# Patient Record
Sex: MALE | Employment: FULL TIME | URBAN - METROPOLITAN AREA
[De-identification: names, ages, dates, MRNs, and addresses within clinical notes are randomized per-mention and may not be internally consistent; named-entity substitution may affect disease eponyms.]

---

## 2020-08-07 ENCOUNTER — THERAPY VISIT (OUTPATIENT)
Dept: PHYSICAL THERAPY | Facility: CLINIC | Age: 45
End: 2020-08-07
Payer: COMMERCIAL

## 2020-08-07 DIAGNOSIS — S46.911A STRAIN OF RIGHT SHOULDER: ICD-10-CM

## 2020-08-07 PROCEDURE — 97110 THERAPEUTIC EXERCISES: CPT | Mod: GP | Performed by: PHYSICAL THERAPIST

## 2020-08-07 PROCEDURE — 97161 PT EVAL LOW COMPLEX 20 MIN: CPT | Mod: GP | Performed by: PHYSICAL THERAPIST

## 2020-08-07 NOTE — PROGRESS NOTES
[facility] Initial Evaluation  Subjective:  The history is provided by the patient. No  was used.   Therapist Generated HPI Evaluation  Problem details: Pt reports first injuring his R shoulder when performing a press 5-6 years ago. More recently mid July 2020 noticed pain after pulling on cord for  (had noticed some pain already coming on from building shed).  MD referred to PT.  Notes history of neck injury from age 21 with resultant R upper extremity gets tired more easily. .         Type of problem:  Right shoulder.    This is a recurrent condition.  Condition occurred with:  Other.    Patient reports pain:  Anterior.    Pain radiates to:  Elbow, hand and upper arm.     Associated symptoms:  Loss of motion/stiffness and loss of strength. Symptoms are exacerbated by using arm overhead, lifting and lying on extremity (pushing)  and relieved by other (prednisone).                              Objective:  System                   Shoulder Evaluation:  ROM:  AROM:  normal (pain during movement on R abduction)                                  Strength:    Flexion: Left:5/5   Pain:    Right: 4/5      Pain:  ++    Abduction:  Left: 5/5  Pain:    Right: 5-/5     Pain:    Internal Rotation:  Left:5/5     Pain:    Right: 5/5     Pain:  External Rotation:   Left:5/5     Pain:   Right:5/5     Pain:        Elbow Flexion:  Left:5/5     Pain:    Right:5-/5      Pain:+  Elbow Extension:  Left:5/5     Pain:    Right:5-/5     Pain:                                           [facility] Cervical Evaluation    Posture:  Sitting: fair            Movement Loss:    Flexion (Flex): nil  Retraction (RET): nil  Extension (EXT): min  Lateral Flexion Right (LF R): nil  Lateral Flexion Left (LF L): nil  Rotation Right (ROT R): nil  Rotation Left (ROT L): nil                                                 ROS    Assessment/Plan:    Patient is a 45 year old male with right side shoulder  complaints.    Patient has the following significant findings with corresponding treatment plan.                Diagnosis 1:  R shoulder strain  Pain -  manual therapy, self management, education, directional preference exercise and home program  Decreased ROM/flexibility - manual therapy, therapeutic exercise, therapeutic activity and home program  Decreased strength - therapeutic exercise, therapeutic activities and home program  Inflammation - self management/home program  Decreased function - therapeutic activities and home program  Impaired posture - neuro re-education and home program      Low complexity using standardized patient assessment instrument and/or measureable assessment of functional outcome.  Cumulative Therapy Evaluation is: Low complexity.    Previous and current functional limitations:  (See Goal Flow Sheet for this information)    Short term and Long term goals: (See Goal Flow Sheet for this information)     Communication ability:  Patient appears to be able to clearly communicate and understand verbal and written communication and follow directions correctly.  Treatment Explanation - The following has been discussed with the patient:   RX ordered/plan of care  Anticipated outcomes  Possible risks and side effects  This patient would benefit from PT intervention to resume normal activities.   Rehab potential is excellent.    Frequency:  1 X week, once daily  Duration:  for 6 weeks  Discharge Plan:  Achieve all LTG.  Independent in home treatment program.  Reach maximal therapeutic benefit.    Please refer to the daily flowsheet for treatment today, total treatment time and time spent performing 1:1 timed codes.

## 2020-10-06 PROBLEM — S46.911A STRAIN OF RIGHT SHOULDER: Status: RESOLVED | Noted: 2020-08-07 | Resolved: 2020-10-06

## 2023-11-09 ENCOUNTER — APPOINTMENT (OUTPATIENT)
Dept: OCCUPATIONAL MEDICINE | Facility: CLINIC | Age: 48
End: 2023-11-09

## 2023-11-09 PROCEDURE — 99499 UNLISTED E&M SERVICE: CPT | Performed by: NURSE PRACTITIONER

## 2023-11-09 PROCEDURE — 99000 SPECIMEN HANDLING OFFICE-LAB: CPT | Performed by: NURSE PRACTITIONER
